# Patient Record
(demographics unavailable — no encounter records)

---

## 2024-10-11 NOTE — HISTORY OF PRESENT ILLNESS
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  Has been focusing on plan from last visit.  Had labs drawn. Interested in starting medication at this time.  5/23/24- Finished Wegovy 0.25mg box.  Had mild nausea on first shot now resolved.  +satiety and less cravings.  Finished school is going to have more time.  Has not been exercising.    6/24/24- Had birthday- moderate dietary compliance.  Starting new class tomorrow.  Towards end of summer will go back to the gym.  +mild nausea occasionally on 0.5mg/week of Weglowy but wants to increase dosage.  Feels he is starting to make progress on satiety.  Denies abd pain, vomiting, GERD, diarrhea or constipation.    7/24/24- Used Zofran first week- not needing anything else.  Wants to stay on Wegovy 1mg/week.  Sticking to vegetables and protein.  Finished school, new schedule at work, less stressed -4 pounds.  Eating 2 meals/day.  Good hydration and sleep.    8/16/24- Wegovy was denied due to inadequate weight loss.  Has been off 2-3 weeks.  Gym 3x a week- cardio and strength.  Cooking meals at home.  Declines bariatric surgery   9/6/24- Feeling much better on Zepbound 2.5mg/week.  Nausea on day 1 which resolves no vomiting.  +URI fever resolved days ago, family members sicks as well but feeling better now.  Was on vacation previously.  Working Tuesday-Thursday.    10/11/24- Feeling well on Zepbound 5mg/week- 3 meals/day 1 small snack- snack is fruit with protein and peanut butter.  Exercise bike and smaller weights- 2x a week.  Switching jobs- planning to join gym.  24 hours on 72 hours off shifts.

## 2024-10-11 NOTE — ASSESSMENT
[FreeTextEntry1] : Weight Loss Progress: - Assessment : Patient has lost weight, now 343 lbs. Eating 3 meals with snacks, getting fruits/veggies and protein. Drinking water. - Plan : - Encourage continuing healthy eating habits  - Recommend joining a gym next month for structured exercise routine  - Suggest trying 15 min weights/exercise on work days if possible  Increase Zepbound 7.5mg/week   Follow-up: - Assessment : Patient switching jobs, may need to adjust medication timing if nausea after injection day  - Plan : - Follow up in 6 weeks  - Advise to contact sooner if any issues with new job/medication

## 2024-10-11 NOTE — REASON FOR VISIT
[Follow-Up] : a follow-up visit [Home] : at home, [unfilled] , at the time of the visit. [Medical Office: ([facility])___] : at the medical office located in  [Patient] : the patient [Self] : self

## 2024-11-07 NOTE — HISTORY OF PRESENT ILLNESS
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  Has been focusing on plan from last visit.  Had labs drawn. Interested in starting medication at this time.  5/23/24- Finished Wegovy 0.25mg box.  Had mild nausea on first shot now resolved.  +satiety and less cravings.  Finished school is going to have more time.  Has not been exercising.    6/24/24- Had birthday- moderate dietary compliance.  Starting new class tomorrow.  Towards end of summer will go back to the gym.  +mild nausea occasionally on 0.5mg/week of Weglowy but wants to increase dosage.  Feels he is starting to make progress on satiety.  Denies abd pain, vomiting, GERD, diarrhea or constipation.    7/24/24- Used Zofran first week- not needing anything else.  Wants to stay on Wegovy 1mg/week.  Sticking to vegetables and protein.  Finished school, new schedule at work, less stressed -4 pounds.  Eating 2 meals/day.  Good hydration and sleep.    8/16/24- Wegovy was denied due to inadequate weight loss.  Has been off 2-3 weeks.  Gym 3x a week- cardio and strength.  Cooking meals at home.  Declines bariatric surgery   9/6/24- Feeling much better on Zepbound 2.5mg/week.  Nausea on day 1 which resolves no vomiting.  +URI fever resolved days ago, family members sicks as well but feeling better now.  Was on vacation previously.  Working Tuesday-Thursday.    10/11/24- Feeling well on Zepbound 5mg/week- 3 meals/day 1 small snack- snack is fruit with protein and peanut butter.  Exercise bike and smaller weights- 2x a week.  Switching jobs- planning to join gym.  24 hours on 72 hours off shifts.    11/7/24- -34 pounds (-10 pounds since last visit).  Exercise bike and weights at home- hasn't joined gym yet.  Feeling happier- more protein- working 24 hours/ time- chicken and salads, cooking more scrambled eggs with cottage cheese, L- sandwiches more fruits s- nuts, good hydration.  Minimal nausea- right after zepbound injection

## 2024-11-07 NOTE — REASON FOR VISIT
[Other Location: e.g. School (Enter Location, City,State)___] : at [unfilled], at the time of the visit. [Medical Office: ([facility])___] : at the medical office located in  [Patient] : the patient [Self] : self [Follow-Up] : a follow-up visit

## 2024-11-07 NOTE — ASSESSMENT
[FreeTextEntry1] : Problem 1: - Assessment : The patient has been making positive lifestyle changes, including exercising, eating a protein-rich diet, and introducing a routine to their lifestyle. They've reported improved mood and sleep schedules, and lost about 10 pounds. The patient has a regular schedule of working for 24 hours then resting for 72 hours. - Plan : Given that the patient's current weight is 333 pounds, continue to promote positive lifestyle changes including implementing a consistent exercise routine. Encourage the patient to join a local gym and ensure they can increase their exercise routine to 150 minutes per week to accelerate weight loss. Continue with the current dosage of the medication Zephound, and monitor for any adverse side effects. The patient's work schedule and early signs of improvement suggest that he/she may continue this weight loss momentum with continued adherence to healthy lifestyle changes. Problem 3: - Assessment : The patient is currently self-learning about healthy recipes and food choices, but may need additional support to ensure they are getting sufficient nutrition as part of their weight loss plan. - Plan : Provide the patient with resources such as the original set of recipes and the holiday guide, to help them create balanced meals. Let the patient know that a nutritionist is available for more in-depth support if needed. Encourage the patient to continue including complex carbohydrates, proteins, and fruits in their diet.

## 2024-12-18 NOTE — HISTORY OF PRESENT ILLNESS
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  Has been focusing on plan from last visit.  Had labs drawn. Interested in starting medication at this time.  5/23/24- Finished Wegovy 0.25mg box.  Had mild nausea on first shot now resolved.  +satiety and less cravings.  Finished school is going to have more time.  Has not been exercising.    6/24/24- Had birthday- moderate dietary compliance.  Starting new class tomorrow.  Towards end of summer will go back to the gym.  +mild nausea occasionally on 0.5mg/week of Weglowy but wants to increase dosage.  Feels he is starting to make progress on satiety.  Denies abd pain, vomiting, GERD, diarrhea or constipation.    7/24/24- Used Zofran first week- not needing anything else.  Wants to stay on Wegovy 1mg/week.  Sticking to vegetables and protein.  Finished school, new schedule at work, less stressed -4 pounds.  Eating 2 meals/day.  Good hydration and sleep.    8/16/24- Wegovy was denied due to inadequate weight loss.  Has been off 2-3 weeks.  Gym 3x a week- cardio and strength.  Cooking meals at home.  Declines bariatric surgery   9/6/24- Feeling much better on Zepbound 2.5mg/week.  Nausea on day 1 which resolves no vomiting.  +URI fever resolved days ago, family members sicks as well but feeling better now.  Was on vacation previously.  Working Tuesday-Thursday.    10/11/24- Feeling well on Zepbound 5mg/week- 3 meals/day 1 small snack- snack is fruit with protein and peanut butter.  Exercise bike and smaller weights- 2x a week.  Switching jobs- planning to join gym.  24 hours on 72 hours off shifts.    11/7/24- -34 pounds (-10 pounds since last visit).  Exercise bike and weights at home- hasn't joined gym yet.  Feeling happier- more protein- working 24 hours/ time- chicken and salads, cooking more scrambled eggs with cottage cheese, L- sandwiches more fruits s- nuts, good hydration.  Minimal nausea- right after zepbound injection  12/18/24- Eating has been better with new job.  Joined gym went 2x.  Doing bike and weight at home.  3 meals/day.  Less hunger.  Peaut butter for snacks.  Inadequate vegetable and fruits.  Minimal nausea next day after shot- no vomiting.  Difficultly preplanning.

## 2024-12-18 NOTE — REASON FOR VISIT
[Home] : at home, [unfilled] , at the time of the visit. [Medical Office: ([facility])___] : at the medical office located in  [Patient] : the patient [Self] : self

## 2024-12-18 NOTE — ASSESSMENT
[FreeTextEntry1] : Back Pain: - Assessment : The patient has been experiencing an increased level of back pain, possibly due to their autoimmune disorder. - Plan : The dosage for sulfasalazine used to manage autoimmune disorder has been increased from two to three pills in the morning and evening, making a total of six pills a day. Autoimmune Disorder: - Assessment : The patient's autoimmune disorder necessitates regular adjustments of their sulfasalazine dosage. - Plan : Continuation on the higher dose of sulfasalazine medication has been planned and a close monitor of health condition is also advised. Weight Loss: - Assessment : The patient lost six pounds within a single month and has been practicing healthier eating habits and routines. - Plan : Patient is advised to continue with the healthy eating and regular exercises. Patient is also encouraged to increase gym frequency and keep a regular check on the weight and food intake. Nausea post increased dosage: - Assessment : The patient occasionally experiences nausea post increased dosage, but this is manageable and bearable. - Assessment : The patient needs to set up their follow-up appointment. - Plan : The next appointment with the patient has been scheduled for the end of January.

## 2025-01-31 NOTE — ASSESSMENT
[FreeTextEntry1] : Obesity and Weight Management: - Assessment : Patient is making good progress with weight loss. Current weight is 318 lbs, down 9 lbs since last visit and approximately 50 lbs from highest recorded weight of 367 lbs. BMI is currently 43. Patient reports looser-fitting clothes and feeling the difference. Experiencing some bloating, possibly due to salt intake. Appetite is well-controlled on current medication dose. Patient joined a gym and is doing cardio once a week, aiming to increase to twice weekly. - Plan : - Continue Zepbound 7.5 mg weekly injection  - Encourage increasing gym visits to twice weekly for cardio, once wrist injury heals  - Advised on healthy meal planning, especially during house renovation project  - Discussed importance of protein intake at breakfast  - Recommended bringing packed meals to renovation site to avoid takeout  - Set interim weight loss goals: First goal BMI < 40 (295 lbs), then BMI < 35 (255 lbs)  - Encouraged continued home cooking and meal planning  - Advised to ensure lean protein sources at every meal  - Recommended increasing water intake  - Follow-up appointment scheduled for April 11th at 10:30 AM  Wrist Injury: - Assessment : Patient mentions a minor wrist sprain from work, not causing significant issues. - Plan : - Monitor wrist injury  - Advised to be cautious during house renovation work and gym activities to prevent further injury

## 2025-01-31 NOTE — REASON FOR VISIT
[Other Location: e.g. School (Enter Location, City,State)___] : at [unfilled], at the time of the visit. [Medical Office: ([facility])___] : at the medical office located in  [Patient] : the patient [Self] : self [Follow-Up] : a follow-up visit

## 2025-01-31 NOTE — HISTORY OF PRESENT ILLNESS
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  Has been focusing on plan from last visit.  Had labs drawn. Interested in starting medication at this time.  5/23/24- Finished Wegovy 0.25mg box.  Had mild nausea on first shot now resolved.  +satiety and less cravings.  Finished school is going to have more time.  Has not been exercising.    6/24/24- Had birthday- moderate dietary compliance.  Starting new class tomorrow.  Towards end of summer will go back to the gym.  +mild nausea occasionally on 0.5mg/week of Weglowy but wants to increase dosage.  Feels he is starting to make progress on satiety.  Denies abd pain, vomiting, GERD, diarrhea or constipation.    7/24/24- Used Zofran first week- not needing anything else.  Wants to stay on Wegovy 1mg/week.  Sticking to vegetables and protein.  Finished school, new schedule at work, less stressed -4 pounds.  Eating 2 meals/day.  Good hydration and sleep.    8/16/24- Wegovy was denied due to inadequate weight loss.  Has been off 2-3 weeks.  Gym 3x a week- cardio and strength.  Cooking meals at home.  Declines bariatric surgery   9/6/24- Feeling much better on Zepbound 2.5mg/week.  Nausea on day 1 which resolves no vomiting.  +URI fever resolved days ago, family members sicks as well but feeling better now.  Was on vacation previously.  Working Tuesday-Thursday.    10/11/24- Feeling well on Zepbound 5mg/week- 3 meals/day 1 small snack- snack is fruit with protein and peanut butter.  Exercise bike and smaller weights- 2x a week.  Switching jobs- planning to join gym.  24 hours on 72 hours off shifts.    11/7/24- -34 pounds (-10 pounds since last visit).  Exercise bike and weights at home- hasn't joined gym yet.  Feeling happier- more protein- working 24 hours/ time- chicken and salads, cooking more scrambled eggs with cottage cheese, L- sandwiches more fruits s- nuts, good hydration.  Minimal nausea- right after zepbound injection  12/18/24- Eating has been better with new job.  Joined gym went 2x.  Doing bike and weight at home.  3 meals/day.  Less hunger.  Peaut butter for snacks.  Inadequate vegetable and fruits.  Minimal nausea next day after shot- no vomiting.  Difficultly preplanning.    1/31/25- Purchased house in [place]- investment property, Struggled with healthy eating- vacationed for a week with family more carbs.  Joined gym- [employer].  Going 1x a week- goal 2x a week.  Traedmill when he goes- hurt wrist at work.  Some neausea on day 1 after injection.  -9 pounds- 50 total.  Remodeling house himself.

## 2025-05-07 NOTE — REASON FOR VISIT
[Other Location: e.g. School (Enter Location, City,State)___] : at [unfilled], at the time of the visit. [Medical Office: ([facility])___] : at the medical office located in  [Telehealth (audio & video)] : This visit was provided via telehealth using real-time 2-way audio visual technology. [Verbal consent obtained from patient] : the patient, [unfilled] [Follow-Up] : a follow-up visit

## 2025-05-13 NOTE — ASSESSMENT
[FreeTextEntry1] : Obesity and Weight Management: - Assessment : Patient has made significant progress in weight loss, currently at 307 lbs, which is a 60 lb reduction from the highest recorded weight. Patient reports no major health changes since the last appointment in January. Currently on Zepbound with minimal side effects (slight nausea on injection day). Patient has adopted a 5-meal pattern with healthy snacking on fruits and nuts. Exercise routine includes going to the gym twice a week, focusing on treadmill and some weight training. Patient is drinking plenty of water and feels comfortable with the current medication dosage. - Plan : - Continue current dosage of Zepbound medication  - Maintain current exercise routine of twice-weekly gym visits  - Encourage continued healthy eating habits and snacking on fruits and nuts  - Advise patient to be mindful of protein content in snack bars, suggesting alternatives like RX bars, Premier Protein bars, or Fair Life shakes  - Monitor weight regularly and report if weight loss plateaus or if increased hunger occurs  - Consider increasing medication dosage if weight loss stalls or hunger increases  - No immediate need for lab work  - Schedule next follow-up appointment in August  - Prescribed 6-month supply of medication (1-month supply with 5 refills)  - Encourage food prepping and planning to maintain healthy eating habits  - Patient to contact the office if any concerns arise before the next appointment

## 2025-05-13 NOTE — HISTORY OF PRESENT ILLNESS
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  Has been focusing on plan from last visit.  Had labs drawn. Interested in starting medication at this time.  5/23/24- Finished Wegovy 0.25mg box.  Had mild nausea on first shot now resolved.  +satiety and less cravings.  Finished school is going to have more time.  Has not been exercising.    6/24/24- Had birthday- moderate dietary compliance.  Starting new class tomorrow.  Towards end of summer will go back to the gym.  +mild nausea occasionally on 0.5mg/week of Weglowy but wants to increase dosage.  Feels he is starting to make progress on satiety.  Denies abd pain, vomiting, GERD, diarrhea or constipation.    7/24/24- Used Zofran first week- not needing anything else.  Wants to stay on Wegovy 1mg/week.  Sticking to vegetables and protein.  Finished school, new schedule at work, less stressed -4 pounds.  Eating 2 meals/day.  Good hydration and sleep.    8/16/24- Wegovy was denied due to inadequate weight loss.  Has been off 2-3 weeks.  Gym 3x a week- cardio and strength.  Cooking meals at home.  Declines bariatric surgery   9/6/24- Feeling much better on Zepbound 2.5mg/week.  Nausea on day 1 which resolves no vomiting.  +URI fever resolved days ago, family members sicks as well but feeling better now.  Was on vacation previously.  Working Tuesday-Thursday.    10/11/24- Feeling well on Zepbound 5mg/week- 3 meals/day 1 small snack- snack is fruit with protein and peanut butter.  Exercise bike and smaller weights- 2x a week.  Switching jobs- planning to join gym.  24 hours on 72 hours off shifts.    11/7/24- -34 pounds (-10 pounds since last visit).  Exercise bike and weights at home- hasn't joined gym yet.  Feeling happier- more protein- working 24 hours/ time- chicken and salads, cooking more scrambled eggs with cottage cheese, L- sandwiches more fruits s- nuts, good hydration.  Minimal nausea- right after zepbound injection  12/18/24- Eating has been better with new job.  Joined gym went 2x.  Doing bike and weight at home.  3 meals/day.  Less hunger.  Peaut butter for snacks.  Inadequate vegetable and fruits.  Minimal nausea next day after shot- no vomiting.  Difficultly preplanning.    1/31/25- Purchased house in [place]- investment property, Struggled with healthy eating- vacationed for a week with family more carbs.  Joined gym- [employer].  Going 1x a week- goal 2x a week.  Treadmill when he goes- hurt wrist at work.  Some nausea on day 1 after injection.  -9 pounds- 50 total.  Remodeling house himself.    5/7/25- -11 pounds.  Nausea on day 1 no vomiting.  Eating a bit more- fruits and nuts- S- nutrigrain bar L- salad with chicken S- chicken nuggets [employer] on way back from work D- rice with beans and chicken and salad Exercise 2x a week- treadmill and weights.  Drinking lots of water.